# Patient Record
Sex: MALE | Race: WHITE | NOT HISPANIC OR LATINO | Employment: FULL TIME | URBAN - METROPOLITAN AREA
[De-identification: names, ages, dates, MRNs, and addresses within clinical notes are randomized per-mention and may not be internally consistent; named-entity substitution may affect disease eponyms.]

---

## 2017-12-04 ENCOUNTER — CLINICAL SUPPORT (OUTPATIENT)
Dept: OCCUPATIONAL MEDICINE | Facility: CLINIC | Age: 37
End: 2017-12-04

## 2017-12-04 DIAGNOSIS — Z02.1 PRE-EMPLOYMENT EXAMINATION: Primary | ICD-10-CM

## 2017-12-04 LAB
BILIRUB UR QL STRIP: NORMAL
GLUCOSE UR QL STRIP: NORMAL
KETONES UR QL STRIP: NORMAL
LEUKOCYTE ESTERASE UR QL STRIP: NORMAL
PH, POC UA: NORMAL (ref 5–8)
POC BLOOD, URINE: NORMAL
POC BREATH ALCOHOL: NORMAL
POC NITRATES, URINE: NORMAL
PROT UR QL STRIP: NORMAL
SP GR UR STRIP: NORMAL (ref 1–1.03)
UROBILINOGEN UR STRIP-ACNC: NORMAL (ref 0.3–2.2)

## 2017-12-04 PROCEDURE — 80305 DRUG TEST PRSMV DIR OPT OBS: CPT | Mod: S$GLB,,, | Performed by: PREVENTIVE MEDICINE

## 2017-12-04 PROCEDURE — 99499 UNLISTED E&M SERVICE: CPT | Mod: S$GLB,,, | Performed by: PREVENTIVE MEDICINE

## 2017-12-04 PROCEDURE — 82075 ASSAY OF BREATH ETHANOL: CPT | Mod: S$GLB,,, | Performed by: PHYSICIAN ASSISTANT

## 2018-11-11 ENCOUNTER — HOSPITAL ENCOUNTER (EMERGENCY)
Facility: HOSPITAL | Age: 38
Discharge: HOME | End: 2018-11-11
Attending: EMERGENCY MEDICINE
Payer: COMMERCIAL

## 2018-11-11 VITALS
RESPIRATION RATE: 20 BRPM | HEIGHT: 71 IN | SYSTOLIC BLOOD PRESSURE: 116 MMHG | BODY MASS INDEX: 28 KG/M2 | OXYGEN SATURATION: 95 % | HEART RATE: 76 BPM | TEMPERATURE: 98 F | DIASTOLIC BLOOD PRESSURE: 80 MMHG | WEIGHT: 200 LBS

## 2018-11-11 DIAGNOSIS — S81.811A LACERATION OF RIGHT LOWER EXTREMITY, INITIAL ENCOUNTER: Primary | ICD-10-CM

## 2018-11-11 PROCEDURE — 99282 EMERGENCY DEPT VISIT SF MDM: CPT | Mod: 25

## 2018-11-11 PROCEDURE — 12001 RPR S/N/AX/GEN/TRNK 2.5CM/<: CPT

## 2018-11-11 PROCEDURE — 90471 IMMUNIZATION ADMIN: CPT | Performed by: PHYSICIAN ASSISTANT

## 2018-11-11 PROCEDURE — 90715 TDAP VACCINE 7 YRS/> IM: CPT | Performed by: PHYSICIAN ASSISTANT

## 2018-11-11 PROCEDURE — 25000003 PHARM REV CODE 250: Performed by: PHYSICIAN ASSISTANT

## 2018-11-11 PROCEDURE — 63600175 PHARM REV CODE 636 W HCPCS: Performed by: PHYSICIAN ASSISTANT

## 2018-11-11 RX ORDER — LIDOCAINE HYDROCHLORIDE AND EPINEPHRINE 10; 10 MG/ML; UG/ML
10 INJECTION, SOLUTION INFILTRATION; PERINEURAL
Status: COMPLETED | OUTPATIENT
Start: 2018-11-11 | End: 2018-11-11

## 2018-11-11 RX ADMIN — LIDOCAINE HYDROCHLORIDE AND EPINEPHRINE 10 ML: 10; 10 INJECTION, SOLUTION INFILTRATION; PERINEURAL at 07:11

## 2018-11-11 RX ADMIN — CLOSTRIDIUM TETANI TOXOID ANTIGEN (FORMALDEHYDE INACTIVATED), CORYNEBACTERIUM DIPHTHERIAE TOXOID ANTIGEN (FORMALDEHYDE INACTIVATED), BORDETELLA PERTUSSIS TOXOID ANTIGEN (GLUTARALDEHYDE INACTIVATED), BORDETELLA PERTUSSIS FILAMENTOUS HEMAGGLUTININ ANTIGEN (FORMALDEHYDE INACTIVATED), BORDETELLA PERTUSSIS PERTACTIN ANTIGEN, AND BORDETELLA PERTUSSIS FIMBRIAE 2/3 ANTIGEN 0.5 ML: 5; 2; 2.5; 5; 3; 5 INJECTION, SUSPENSION INTRAMUSCULAR at 07:11

## 2018-11-11 RX ADMIN — BACITRACIN, NEOMYCIN, POLYMYXIN B 1 EACH: 400; 3.5; 5 OINTMENT TOPICAL at 07:11

## 2018-11-12 NOTE — DISCHARGE INSTRUCTIONS
Return to ER sooner if purulent drainage, increased warmth, increased redness, increased pain and fever. Keep wound clean and dry with mild soap and water two to three times daily.

## 2018-11-12 NOTE — ED PROVIDER NOTES
Encounter Date: 11/11/2018       History     Chief Complaint   Patient presents with    Laceration     Pt was cutting carpet and cut the R top of knee. Bleeding is controlled at this time.     Patient presents to the emergency department with complaint laceration to right upper leg x1 to 2 hr.  Patient states he was cutting his carpet that he was working on with a razor blade when he accidentally cut himself.  Patient states he did not clean wound prior to arrival.  Patient here for treatment of laceration.          Review of patient's allergies indicates:  No Known Allergies  History reviewed. No pertinent past medical history.  History reviewed. No pertinent surgical history.  History reviewed. No pertinent family history.  Social History     Tobacco Use    Smoking status: Never Smoker   Substance Use Topics    Alcohol use: No     Frequency: Never    Drug use: Not on file     Review of Systems   Constitutional: Negative for chills and fever.   HENT: Negative for congestion and sore throat.    Respiratory: Negative for cough and shortness of breath.    Cardiovascular: Negative for chest pain.   Gastrointestinal: Negative for abdominal pain, diarrhea, nausea and vomiting.   Genitourinary: Negative for difficulty urinating.   Musculoskeletal: Negative for back pain and neck pain.   Skin: Positive for wound.   Neurological: Negative for headaches.       Physical Exam     Initial Vitals [11/11/18 1844]   BP Pulse Resp Temp SpO2   124/82 75 18 97.8 °F (36.6 °C) 97 %      MAP       --         Physical Exam    Nursing note and vitals reviewed.  Constitutional: He appears well-developed and well-nourished. He is not diaphoretic. No distress.   HENT:   Head: Normocephalic and atraumatic.   Eyes: Conjunctivae and EOM are normal. Pupils are equal, round, and reactive to light. Right eye exhibits no discharge. Left eye exhibits no discharge. No scleral icterus.   Neck: Normal range of motion. Neck supple.   Cardiovascular:  "Normal rate, regular rhythm and normal heart sounds.   Pulmonary/Chest: Breath sounds normal.   Abdominal: Soft. Bowel sounds are normal.   Neurological: He is alert and oriented to person, place, and time.   Skin: Skin is warm and dry.   Right leg just above knee, 1.5cm laceration with mild blood discharge observed.   Psychiatric: He has a normal mood and affect.         ED Course   Lac Repair  Date/Time: 2018 8:04 PM  Performed by: MILLY Pedraza  Authorized by: Juan C Chan MD   Consent Done: Yes  Consent: Verbal consent obtained.  Risks and benefits: risks, benefits and alternatives were discussed  Consent given by: patient (verbal)  Patient identity confirmed:  and name  Time out: Immediately prior to procedure a "time out" was called to verify the correct patient, procedure, equipment, support staff and site/side marked as required.  Body area: lower extremity  Location details: right upper leg  Laceration length: 1.5 cm  Tendon involvement: none  Nerve involvement: none  Vascular damage: no  Anesthesia: local infiltration    Anesthesia:  Local Anesthetic: lidocaine 2% with epinephrine  Anesthetic total: 2 mL  Patient sedated: no  Preparation: Patient was prepped and draped in the usual sterile fashion.  Irrigation solution: saline  Amount of cleaning: standard  Debridement: none  Degree of undermining: none  Skin closure: 4-0 nylon  Number of sutures: 5  Technique: running  Approximation: close  Approximation difficulty: simple  Dressing: 4x4 sterile gauze  Patient tolerance: Patient tolerated the procedure well with no immediate complications        Labs Reviewed - No data to display       Imaging Results    None          Medical Decision Making:   ED Management:  Patient presents emergency department with complaint laceration to right upper leg.  See laceration repair.  Patient tolerated well. Instructed patient to keep wound clean and dry with mild soap and water.  Return precautions " given.  Patient agrees with treatment plan and voiced understanding.                      Clinical Impression:   The encounter diagnosis was Laceration of right lower extremity, initial encounter.      Disposition:   Disposition: Discharged  Condition: Stable                        MILLY Pedraza  11/11/18 2008

## 2018-11-12 NOTE — ED TRIAGE NOTES
Pt reports he was cutting carpet and cut the R top of knee. Bleeding is controlled at this time. Denies taking pain medication. Pain level 2/10.

## 2018-11-12 NOTE — ED PROVIDER NOTES
Encounter Date: 11/11/2018  SORT: This is a 38 y.o. male who presents for emergent consideration of laceration of right. Patient reports injury with razor blade used for cutting carpet. He is unsure of last tetanus. Patient will be moved to a room when one is available. Orders placed.  BEE Mason PA-C        History     Chief Complaint   Patient presents with    Laceration     Pt was cutting carpet and cut the R top of knee. Bleeding is controlled at this time.     HPI  Review of patient's allergies indicates:  No Known Allergies  No past medical history on file.  No past surgical history on file.  No family history on file.  Social History     Tobacco Use    Smoking status: Not on file   Substance Use Topics    Alcohol use: Not on file    Drug use: Not on file     Review of Systems    Physical Exam     Initial Vitals [11/11/18 1844]   BP Pulse Resp Temp SpO2   124/82 75 18 97.8 °F (36.6 °C) 97 %      MAP       --         Physical Exam    ED Course   Procedures  Labs Reviewed - No data to display       Imaging Results    None                               Clinical Impression:   {Add your Clinical Impression here. If you haven't documented one yet, please pend the note, finalize a Clinical Impression, and refresh your note before signing.:[id number]}

## 2019-08-19 ENCOUNTER — HOSPITAL ENCOUNTER (EMERGENCY)
Facility: HOSPITAL | Age: 39
Discharge: HOME | End: 2019-08-20
Attending: EMERGENCY MEDICINE
Payer: COMMERCIAL

## 2019-08-19 DIAGNOSIS — S39.012A BACK STRAIN, INITIAL ENCOUNTER: Primary | ICD-10-CM

## 2019-08-19 PROCEDURE — 99284 EMERGENCY DEPT VISIT MOD MDM: CPT | Mod: 25

## 2019-08-19 PROCEDURE — 25000003 PHARM REV CODE 250: Performed by: EMERGENCY MEDICINE

## 2019-08-19 PROCEDURE — 63600175 PHARM REV CODE 636 W HCPCS: Performed by: EMERGENCY MEDICINE

## 2019-08-19 PROCEDURE — 96372 THER/PROPH/DIAG INJ SC/IM: CPT

## 2019-08-19 RX ORDER — MELOXICAM 7.5 MG/1
7.5 TABLET ORAL DAILY
Qty: 20 TABLET | Refills: 0 | Status: SHIPPED | OUTPATIENT
Start: 2019-08-19

## 2019-08-19 RX ORDER — KETOROLAC TROMETHAMINE 30 MG/ML
30 INJECTION, SOLUTION INTRAMUSCULAR; INTRAVENOUS
Status: COMPLETED | OUTPATIENT
Start: 2019-08-19 | End: 2019-08-19

## 2019-08-19 RX ORDER — METHOCARBAMOL 500 MG/1
500 TABLET, FILM COATED ORAL
Status: COMPLETED | OUTPATIENT
Start: 2019-08-19 | End: 2019-08-19

## 2019-08-19 RX ORDER — METHOCARBAMOL 500 MG/1
500 TABLET, FILM COATED ORAL 3 TIMES DAILY
Qty: 15 TABLET | Refills: 0 | Status: SHIPPED | OUTPATIENT
Start: 2019-08-19 | End: 2019-08-24

## 2019-08-19 RX ADMIN — METHOCARBAMOL 500 MG: 500 TABLET, FILM COATED ORAL at 11:08

## 2019-08-19 RX ADMIN — KETOROLAC TROMETHAMINE 30 MG: 30 INJECTION, SOLUTION INTRAMUSCULAR at 11:08

## 2019-08-20 VITALS
OXYGEN SATURATION: 99 % | RESPIRATION RATE: 16 BRPM | HEART RATE: 75 BPM | TEMPERATURE: 98 F | SYSTOLIC BLOOD PRESSURE: 110 MMHG | HEIGHT: 71 IN | WEIGHT: 212 LBS | DIASTOLIC BLOOD PRESSURE: 75 MMHG | BODY MASS INDEX: 29.68 KG/M2
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
## 2019-11-25 ENCOUNTER — OCCUPATIONAL HEALTH (OUTPATIENT)
Dept: URGENT CARE | Facility: CLINIC | Age: 39
End: 2019-11-25

## 2019-11-25 DIAGNOSIS — Z00.00 ANNUAL PHYSICAL EXAM: Primary | ICD-10-CM

## 2019-11-25 PROCEDURE — 99499 UNLISTED E&M SERVICE: CPT | Mod: S$GLB,,, | Performed by: NURSE PRACTITIONER

## 2019-11-25 PROCEDURE — 99499 DOT PHYSICAL: ICD-10-PCS | Mod: S$GLB,,, | Performed by: NURSE PRACTITIONER

## 2021-11-18 ENCOUNTER — OCCUPATIONAL HEALTH (OUTPATIENT)
Dept: URGENT CARE | Facility: CLINIC | Age: 41
End: 2021-11-18

## 2021-11-18 DIAGNOSIS — Z00.00 ANNUAL PHYSICAL EXAM: Primary | ICD-10-CM

## 2021-11-18 PROCEDURE — 99499 UNLISTED E&M SERVICE: CPT | Mod: S$GLB,,, | Performed by: EMERGENCY MEDICINE

## 2021-11-18 PROCEDURE — 99499 DOT PHYSICAL: ICD-10-PCS | Mod: S$GLB,,, | Performed by: EMERGENCY MEDICINE

## 2023-11-10 ENCOUNTER — OCCUPATIONAL HEALTH (OUTPATIENT)
Dept: URGENT CARE | Facility: CLINIC | Age: 43
End: 2023-11-10

## 2023-11-10 DIAGNOSIS — Z00.00 ENCOUNTER FOR PHYSICAL EXAMINATION: Primary | ICD-10-CM

## 2023-11-10 PROCEDURE — 99499 DOT PHYSICAL: ICD-10-PCS | Mod: S$GLB,,, | Performed by: EMERGENCY MEDICINE

## 2023-11-10 PROCEDURE — 99499 UNLISTED E&M SERVICE: CPT | Mod: S$GLB,,, | Performed by: EMERGENCY MEDICINE
